# Patient Record
Sex: FEMALE | URBAN - NONMETROPOLITAN AREA
[De-identification: names, ages, dates, MRNs, and addresses within clinical notes are randomized per-mention and may not be internally consistent; named-entity substitution may affect disease eponyms.]

---

## 2022-12-09 ENCOUNTER — WEB ENCOUNTER (OUTPATIENT)
Dept: URBAN - NONMETROPOLITAN AREA CLINIC 2 | Facility: CLINIC | Age: 52
End: 2022-12-09

## 2022-12-14 ENCOUNTER — DASHBOARD ENCOUNTERS (OUTPATIENT)
Age: 52
End: 2022-12-14

## 2022-12-14 ENCOUNTER — OFFICE VISIT (OUTPATIENT)
Dept: URBAN - NONMETROPOLITAN AREA CLINIC 2 | Facility: CLINIC | Age: 52
End: 2022-12-14
Payer: COMMERCIAL

## 2022-12-14 VITALS
TEMPERATURE: 97.5 F | SYSTOLIC BLOOD PRESSURE: 120 MMHG | WEIGHT: 155 LBS | DIASTOLIC BLOOD PRESSURE: 72 MMHG | HEART RATE: 74 BPM | HEIGHT: 64 IN | BODY MASS INDEX: 26.46 KG/M2

## 2022-12-14 DIAGNOSIS — Z80.0 FAMILY HISTORY OF PANCREATIC CANCER: ICD-10-CM

## 2022-12-14 DIAGNOSIS — Z12.11 SCREENING FOR MALIGNANT NEOPLASM OF COLON: ICD-10-CM

## 2022-12-14 DIAGNOSIS — K58.1 IRRITABLE BOWEL SYNDROME WITH CONSTIPATION: ICD-10-CM

## 2022-12-14 DIAGNOSIS — R09.89 THROAT CLEARING: ICD-10-CM

## 2022-12-14 DIAGNOSIS — K21.9 CHRONIC GERD: ICD-10-CM

## 2022-12-14 PROBLEM — 440630006: Status: ACTIVE | Noted: 2022-12-14

## 2022-12-14 PROCEDURE — 99204 OFFICE O/P NEW MOD 45 MIN: CPT | Performed by: INTERNAL MEDICINE

## 2022-12-14 NOTE — HPI-TODAY'S VISIT:
52-year-old female referred to GI clinic for evaluation of chronic GERD, bloating/IBS, screening colonoscopy, family history of pancreatic cancer.  She reports that she was evaluated by ENT for chronic throat clearing and occasional acid regurgitation and was found to have irritation of her vocal cords on a laryngoscopy.  She also states that she gets bloated and gassy with many different foods and has been looking into low FODMAP diet which does seem to help.  She has a history of constipation and moves her bowels every couple days.  They are somewhat pellet shaped in nature.  She does not take any medications for her GI symptoms.  She was recommended a trial of Prilosec but did not take this due to fear of side effects.  She does have an extensive family history of pancreatic cancer.  She actually saw previous GI in 2017 and had an MRI that was negative for any pancreatic lesions.  She had genetic testing as well at [place] which was negative for any specific genetic abnormalities.  She was recommended to have another pancreatic imaging in around a 5-year timeframe.  She has never had EGD or colonoscopy.  She is somewhat fearful of these procedures.

## 2022-12-15 ENCOUNTER — WEB ENCOUNTER (OUTPATIENT)
Dept: URBAN - METROPOLITAN AREA CLINIC 92 | Facility: CLINIC | Age: 52
End: 2022-12-15

## 2022-12-15 LAB
BUN/CREATININE RATIO: 18
BUN: 12
CARBON DIOXIDE, TOTAL: 20
CHLORIDE: 104
CREATININE: 0.65
EGFR: 105
GLUCOSE: 75
POTASSIUM: 4.2
SODIUM: 142

## 2022-12-28 ENCOUNTER — OFFICE VISIT (OUTPATIENT)
Dept: URBAN - NONMETROPOLITAN AREA CLINIC 2 | Facility: CLINIC | Age: 52
End: 2022-12-28

## 2023-02-15 PROBLEM — 235595009: Status: ACTIVE | Noted: 2022-12-14

## 2023-02-28 ENCOUNTER — TELEPHONE ENCOUNTER (OUTPATIENT)
Dept: URBAN - NONMETROPOLITAN AREA CLINIC 2 | Facility: CLINIC | Age: 53
End: 2023-02-28

## 2023-03-07 ENCOUNTER — CLAIMS CREATED FROM THE CLAIM WINDOW (OUTPATIENT)
Dept: URBAN - NONMETROPOLITAN AREA SURGERY CENTER 1 | Facility: SURGERY CENTER | Age: 53
End: 2023-03-07
Payer: COMMERCIAL

## 2023-03-07 ENCOUNTER — OFFICE VISIT (OUTPATIENT)
Dept: URBAN - NONMETROPOLITAN AREA SURGERY CENTER 1 | Facility: SURGERY CENTER | Age: 53
End: 2023-03-07

## 2023-03-07 DIAGNOSIS — Z12.11 COLON CANCER SCREENING: ICD-10-CM

## 2023-03-07 DIAGNOSIS — K31.89 ACQUIRED DEFORMITY OF DUODENUM: ICD-10-CM

## 2023-03-07 DIAGNOSIS — K29.60 ADENOPAPILLOMATOSIS GASTRICA: ICD-10-CM

## 2023-03-07 DIAGNOSIS — R05.3 CHRONIC COUGH: ICD-10-CM

## 2023-03-07 PROCEDURE — G8907 PT DOC NO EVENTS ON DISCHARG: HCPCS | Performed by: INTERNAL MEDICINE

## 2023-03-07 PROCEDURE — 43239 EGD BIOPSY SINGLE/MULTIPLE: CPT | Performed by: INTERNAL MEDICINE

## 2023-03-07 PROCEDURE — 45378 DIAGNOSTIC COLONOSCOPY: CPT | Performed by: INTERNAL MEDICINE

## 2023-06-14 ENCOUNTER — OFFICE VISIT (OUTPATIENT)
Dept: URBAN - NONMETROPOLITAN AREA CLINIC 2 | Facility: CLINIC | Age: 53
End: 2023-06-14